# Patient Record
Sex: FEMALE | Race: WHITE | NOT HISPANIC OR LATINO | Employment: FULL TIME | URBAN - METROPOLITAN AREA
[De-identification: names, ages, dates, MRNs, and addresses within clinical notes are randomized per-mention and may not be internally consistent; named-entity substitution may affect disease eponyms.]

---

## 2018-12-13 ENCOUNTER — OFFICE VISIT (OUTPATIENT)
Dept: FAMILY MEDICINE CLINIC | Facility: CLINIC | Age: 23
End: 2018-12-13

## 2018-12-13 VITALS
WEIGHT: 139 LBS | HEIGHT: 62 IN | BODY MASS INDEX: 25.58 KG/M2 | OXYGEN SATURATION: 98 % | SYSTOLIC BLOOD PRESSURE: 116 MMHG | HEART RATE: 82 BPM | DIASTOLIC BLOOD PRESSURE: 62 MMHG

## 2018-12-13 DIAGNOSIS — Z23 NEED FOR IMMUNIZATION AGAINST INFLUENZA: ICD-10-CM

## 2018-12-13 DIAGNOSIS — Z30.018 ENCOUNTER FOR INITIAL PRESCRIPTION OF OTHER CONTRACEPTIVES: ICD-10-CM

## 2018-12-13 DIAGNOSIS — L30.9 DERMATITIS: ICD-10-CM

## 2018-12-13 DIAGNOSIS — B00.9 HERPES SIMPLEX: ICD-10-CM

## 2018-12-13 DIAGNOSIS — M25.512 CHRONIC LEFT SHOULDER PAIN: ICD-10-CM

## 2018-12-13 DIAGNOSIS — G89.29 CHRONIC LEFT SHOULDER PAIN: ICD-10-CM

## 2018-12-13 DIAGNOSIS — L70.0 ACNE VULGARIS: Primary | ICD-10-CM

## 2018-12-13 PROCEDURE — 99204 OFFICE O/P NEW MOD 45 MIN: CPT | Performed by: FAMILY MEDICINE

## 2018-12-13 PROCEDURE — 90471 IMMUNIZATION ADMIN: CPT | Performed by: FAMILY MEDICINE

## 2018-12-13 PROCEDURE — 90686 IIV4 VACC NO PRSV 0.5 ML IM: CPT | Performed by: FAMILY MEDICINE

## 2018-12-13 RX ORDER — CLINDAMYCIN AND BENZOYL PEROXIDE 10; 50 MG/G; MG/G
GEL TOPICAL 2 TIMES DAILY
Qty: 50 G | Refills: 5 | Status: SHIPPED | OUTPATIENT
Start: 2018-12-13 | End: 2020-11-10

## 2018-12-13 RX ORDER — CYCLOBENZAPRINE HCL 5 MG
5-10 TABLET ORAL NIGHTLY PRN
Qty: 30 TABLET | Refills: 5 | Status: SHIPPED | OUTPATIENT
Start: 2018-12-13 | End: 2019-01-31

## 2018-12-13 RX ORDER — TRIAMCINOLONE ACETONIDE 1 MG/G
CREAM TOPICAL 2 TIMES DAILY
Qty: 30 G | Refills: 0 | Status: SHIPPED | OUTPATIENT
Start: 2018-12-13 | End: 2019-01-31

## 2018-12-13 RX ORDER — FAMCICLOVIR 500 MG/1
1000 TABLET ORAL 2 TIMES DAILY
Qty: 16 TABLET | Refills: 0 | Status: SHIPPED | OUTPATIENT
Start: 2018-12-13 | End: 2018-12-14

## 2018-12-13 NOTE — PROGRESS NOTES
Mayda Hernandez presents today to establish care.    Chief Complaint   Patient presents with   • Establish Care     needs PCP        HPI     Acne:  Took birth control since teens and recently stopped in March. Past 3 months acne is bad. Worst its been in her life. Face and neck. Itching on neck and red, dryness. Two years ago used prescription face wash benzoyl peroxide, but it was . Tried clinique acne face wash and lotion, cerave lotion. Tried differin gel but dried out really bad. No aggravating factors.     Birth control:  Stopped pills earlier this year. Considering IUD. Not interested in pregnancy. Likes not having to take pill every day and conveinence. Currently sexually active with men, uses condoms most of the time. LMP 18. Usually periods every 45-50 days. Never pregnant. H/o abnormal pap smears, last done 2-3 years ago. Sometimes bleeds during sex, happened 5 times.     Shoulder pain:  Onset years ago. Worse sitting at a desk all day. Past 5-6 months daily pain. Posterior left shoulder blade. Left hand dominant. Achy pain, can't relieve. If its bad she takes ibuprofen. Feels like nerve, not muscle or bone. No tried chiropractor or massage.     Herpes:  She has a history of genital herpes type I which she was previously given a prescription for Famvir to take as needed for outbreaks.  She reports infrequent outbreaks.      Review of Systems   Constitutional: Negative.    HENT: Negative.    Eyes: Negative.    Respiratory: Negative.    Cardiovascular: Negative.    Gastrointestinal: Negative.    Genitourinary: Positive for menstrual problem.   Musculoskeletal: Positive for arthralgias.   Skin: Positive for rash.   Neurological: Negative.    Hematological: Negative.    Psychiatric/Behavioral: The patient is nervous/anxious.         Past Medical History:   Diagnosis Date   • Abnormal Pap smear of cervix    • Acne    • Herpes     HSV-1 genital        Past Surgical History:   Procedure Laterality  "Date   • WISDOM TOOTH EXTRACTION          Family History   Problem Relation Age of Onset   • Kidney cancer Maternal Grandfather    • Stroke Maternal Grandfather    • Stroke Paternal Grandfather         Social History     Socioeconomic History   • Marital status: Single     Spouse name: Not on file   • Number of children: Not on file   • Years of education: Not on file   • Highest education level: Not on file   Social Needs   • Financial resource strain: Not on file   • Food insecurity - worry: Not on file   • Food insecurity - inability: Not on file   • Transportation needs - medical: Not on file   • Transportation needs - non-medical: Not on file   Occupational History   • Not on file   Tobacco Use   • Smoking status: Never Smoker   • Smokeless tobacco: Never Used   Substance and Sexual Activity   • Alcohol use: No     Frequency: Never   • Drug use: No   • Sexual activity: Not on file   Other Topics Concern   • Not on file   Social History Narrative   • Not on file        No current outpatient medications on file prior to visit.     No current facility-administered medications on file prior to visit.        No Known Allergies     Visit Vitals  /62 (BP Location: Left arm, Patient Position: Sitting, Cuff Size: Adult)   Pulse 82   Ht 157.5 cm (62\")   Wt 63 kg (139 lb)   SpO2 98%   BMI 25.42 kg/m²        Physical Exam   Constitutional: She is oriented to person, place, and time. No distress.   HENT:   Nose: Nose normal.   Mouth/Throat: Oropharynx is clear and moist.   Eyes: Conjunctivae are normal. Pupils are equal, round, and reactive to light.   Neck: Neck supple. No thyromegaly present.   Cardiovascular: Normal rate and regular rhythm.   No murmur heard.  Pulses:       Posterior tibial pulses are 2+ on the right side, and 2+ on the left side.   Pulmonary/Chest: Effort normal and breath sounds normal.   Abdominal: Soft. There is no hepatosplenomegaly. There is no tenderness.   Musculoskeletal: She exhibits no " edema.        Right shoulder: She exhibits no bony tenderness, no deformity and no spasm.        Left shoulder: She exhibits no bony tenderness, no deformity and no spasm.        Arms:  Bilateral rounded shoulders   Lymphadenopathy:     She has no cervical adenopathy.   Neurological: She is alert and oriented to person, place, and time.   Skin: Skin is warm and dry. No rash noted.   Moderate inflammatory papules bilateral cheeks and chin.  Anterior neck midline cherry-red erythematous macules.   Psychiatric: She has a normal mood and affect. Her behavior is normal. Judgment and thought content normal.   Vitals reviewed.     Immunization History   Administered Date(s) Administered   • FLUARIX/FLUZONE/AFLURIA/FLULAVAL QUAD 12/13/2018         No results found for this or any previous visit.     Mayda was seen today for establish care.  Prior gynecology records requested.  Diagnoses and all orders for this visit:    Acne vulgaris  -     clindamycin-benzoyl peroxide (BENZACLIN) 1-5 % gel; Apply  topically to the appropriate area as directed 2 (Two) Times a Day.  New.  Previous medications tried include benzoyl peroxide, oral contraceptive and Differin gel.  At this time start dual treatment with benzaclin starting once at night increasing to 2 times a day as tolerated.  Advised may take 6-8 weeks to notice full clearing of skin.  Dermatitis  -     triamcinolone (KENALOG) 0.1 % cream; Apply  topically to the appropriate area as directed 2 (Two) Times a Day.  New.  Suspect allergic or contact.  Start steroid 2 times a day for up to 2 weeks until clear.  Chronic left shoulder pain  -     cyclobenzaprine (FLEXERIL) 5 MG tablet; Take 1-2 tablets by mouth At Night As Needed for Muscle Spasms.  New.  Suspect poor posture.  Discussed exercises and stretching.  Start Flexeril as needed at night to help spasms.  May continue to use over-the-counter anti-inflammatories as needed.  Encounter for initial prescription of other  contraceptives  Patient interested in long-acting reversible contraception.  Discussed IUD and specific handout provided on [facility]ju[facility] jose.  Discussed with patient she would need to return for physical with Pap as well as obtain her prior Pap in gynecology records for me to review.  She will need to have STI screening beforehand.   Herpes simplex  -     famciclovir (FAMVIR) 500 MG tablet; Take 2 tablets by mouth 2 (Two) Times a Day for 1 day.  Chronic.  Patient given refill prescription for antiviral to take as needed for episodes.  Need for immunization against influenza  -     Fluarix/Fluzone/Afluria Quad>6 Months; Standing  -     Fluarix/Fluzone/Afluria Quad>6 Months        Return for  Annual physical pap .

## 2019-01-23 ENCOUNTER — TELEPHONE (OUTPATIENT)
Dept: FAMILY MEDICINE CLINIC | Facility: CLINIC | Age: 24
End: 2019-01-23

## 2019-01-23 DIAGNOSIS — L70.0 ACNE VULGARIS: Primary | ICD-10-CM

## 2019-01-23 DIAGNOSIS — B00.9 HERPES SIMPLEX: ICD-10-CM

## 2019-01-23 NOTE — TELEPHONE ENCOUNTER
Pt called requesting a referral to see a Dermatologist and a Gynecologist     Pt requesting a call back

## 2019-01-31 ENCOUNTER — OFFICE VISIT (OUTPATIENT)
Dept: OBSTETRICS AND GYNECOLOGY | Facility: CLINIC | Age: 24
End: 2019-01-31

## 2019-01-31 VITALS
WEIGHT: 135.4 LBS | SYSTOLIC BLOOD PRESSURE: 102 MMHG | HEIGHT: 62 IN | BODY MASS INDEX: 24.92 KG/M2 | DIASTOLIC BLOOD PRESSURE: 70 MMHG

## 2019-01-31 DIAGNOSIS — L70.0 ACNE VULGARIS: ICD-10-CM

## 2019-01-31 DIAGNOSIS — N92.6 IRREGULAR MENSES: ICD-10-CM

## 2019-01-31 DIAGNOSIS — Z01.419 ENCOUNTER FOR GYNECOLOGICAL EXAMINATION WITHOUT ABNORMAL FINDING: Primary | ICD-10-CM

## 2019-01-31 PROBLEM — B97.7 HIGH RISK HPV INFECTION: Status: ACTIVE | Noted: 2017-04-06

## 2019-01-31 PROCEDURE — 99385 PREV VISIT NEW AGE 18-39: CPT | Performed by: OBSTETRICS & GYNECOLOGY

## 2019-01-31 NOTE — PROGRESS NOTES
Chief Complaint   Patient presents with   • Menstrual Problem     irregular menses, acne   • Contraception     discuss options   • Gynecologic Exam     desires STD screening       Mayda Hernandez is a 24 y.o. year old  presenting to be seen for her first annual exam with me.  This patient states that she had cyclic menses at the time of menarche, at 13 years of age.  She took oral contraceptives for 7 years and had cyclic menses.  She developed acne while on oral contraceptives.  Since discontinuing oral contraceptives last year she has had irregular menses.  Her last menstrual period was 2018.  She denies symptoms of pregnancy.  She denies headaches or visual symptoms.  She has no history of thyroid disease.  She continues to have acne vulgaris of the face and neck.  She denies bowel or urinary symptoms.  She states that she has had HPV positivity in the past however her last Pap test in 2018 was negative for HPV.  Her partner uses condoms for contraception.  SCREENING TESTS    Year 2012   Age                         PAP       Neg.                  HPV high risk       Neg.                  Mammogram                         NATASHA score                         Breast MRI                         Lipids                         Vitamin D                         Colonoscopy                         DEXA  Frax (hip/any)                         Ovarian Screen                             She exercises regularly: yes.  She wears her seat belt: yes.  She has concerns about domestic violence: no.  She has noticed changes in height: no    GYN screening history:  · Last pap: was done on approximately 2018 and the result was: normal PAP..    No Additional Complaints Reported    The following portions of the patient's history were reviewed and updated as appropriate:vital signs and   She  has a past  "medical history of Abnormal Pap smear of cervix, Acne, Herpes, High risk HPV infection (04/06/2017), and LGSIL on Pap smear of cervix (01/13/2016).  She does not have any pertinent problems on file.  She  has a past surgical history that includes [place] tooth extraction.  Her family history includes Kidney cancer in her maternal grandfather; Stroke in her maternal grandfather and paternal grandfather.  She  reports that  has never smoked. she has never used smokeless tobacco. She reports that she does not drink alcohol or use drugs.  Current Outpatient Medications   Medication Sig Dispense Refill   • clindamycin-benzoyl peroxide (BENZACLIN) 1-5 % gel Apply  topically to the appropriate area as directed 2 (Two) Times a Day. 50 g 5     No current facility-administered medications for this visit.      She has No Known Allergies..    Review of Systems  A comprehensive review of systems was taken.  Constitutional: negative for fever, chills, activity change, appetite change, fatigue and unexpected weight change.  Respiratory: negative  Cardiovascular: negative  Gastrointestinal: negative  Genitourinary:positive for irregular menses  Musculoskeletal:negative  Behavioral/Psych: negative       /70   Ht 157.5 cm (62\")   Wt 61.4 kg (135 lb 6.4 oz)   LMP 11/01/2018 (Exact Date)   BMI 24.76 kg/m²     Physical Exam    General:  alert; cooperative; well developed; well nourished   Skin:  Facial acne   Thyroid: normal to inspection and palpation   Lungs:  clear to auscultation bilaterally   Heart:  regular rate and rhythm, S1, S2 normal, no murmur, click, rub or gallop   Breasts:  Examined in supine position  Symmetric without masses or skin dimpling  Nipples normal without inversion, lesions or discharge  There are no palpable axillary nodes   Abdomen: soft, non-tender; no masses  no umbilical or inguinal hernias are present  no hepato-splenomegaly   Pelvis: Clinical staff was present for exam  External genitalia:  " normal appearance of the external genitalia including Bartholin's and [place]'s glands.  Vaginal:  normal pink mucosa without prolapse or lesions.  Cervix:  normal appearance.  Uterus:  normal size, shape and consistency. anteverted;  Adnexa:  normal bimanual exam of the adnexa.  Rectal:  anus visually normal appearing. recto-vaginal exam unremarkable and confirms findings;     Lab Review   No data reviewed    Imaging  No data reviewed         ASSESSMENT  Problems Addressed this Visit        Musculoskeletal and Integument    Acne    Relevant Orders    Testosterone      Other Visit Diagnoses     Encounter for gynecological examination without abnormal finding    -  Primary    Relevant Orders    Liquid-based Pap Smear, Screening    Irregular menses        Relevant Orders    TSH    Prolactin    Glucose, Fasting    Insulin, Random    HCG, B-subunit, Quantitative          PLAN    · Medications prescribed this encounter:  No orders of the defined types were placed in this encounter.  · I have instructed the patient in monthly self breast assessment  · I have had a 12 minute face-to-face discussion with the patient about her clinical findings of irregular menses and facial acne.  I have counseled her that an oral contraceptive would be the best treatment for both conditions.  I have counseled her that she needs to have a fasting glucose, fasting insulin, TSH, HCG, total testosterone, and a prolactin ordered.  She does not desire to start oral contraceptives until after her laboratory evaluation.  I have counseled her to see dermatology.  · Regular weight-bearing exercise  · Follow up: 6 month(s)  *Please note that portions of this documentation may have been completed with a voice recognition program.  Efforts were made to edit this dictation, but occasional words may have been mistranscribed.       This note was electronically signed.    DRAGAN Purcell MD  January 31, 2019  9:30 AM

## 2019-02-01 ENCOUNTER — LAB (OUTPATIENT)
Dept: LAB | Facility: HOSPITAL | Age: 24
End: 2019-02-01

## 2019-02-01 DIAGNOSIS — N92.6 IRREGULAR MENSES: ICD-10-CM

## 2019-02-01 DIAGNOSIS — L70.0 ACNE VULGARIS: ICD-10-CM

## 2019-02-01 LAB
GLUCOSE P FAST SERPL-MCNC: 91 MG/DL (ref 65–99)
HCG INTACT+B SERPL-ACNC: <5 MIU/ML
PROLACTIN SERPL-MCNC: 11.5 NG/ML
TESTOST SERPL-MCNC: 44.55 NG/DL (ref 0–813.86)
TSH SERPL DL<=0.05 MIU/L-ACNC: 1.94 MIU/ML (ref 0.35–5.35)

## 2019-02-01 PROCEDURE — 83525 ASSAY OF INSULIN: CPT

## 2019-02-01 PROCEDURE — 84403 ASSAY OF TOTAL TESTOSTERONE: CPT

## 2019-02-01 PROCEDURE — 82947 ASSAY GLUCOSE BLOOD QUANT: CPT

## 2019-02-01 PROCEDURE — 84443 ASSAY THYROID STIM HORMONE: CPT

## 2019-02-01 PROCEDURE — 36415 COLL VENOUS BLD VENIPUNCTURE: CPT

## 2019-02-01 PROCEDURE — 84702 CHORIONIC GONADOTROPIN TEST: CPT

## 2019-02-01 PROCEDURE — 84146 ASSAY OF PROLACTIN: CPT

## 2019-02-06 LAB — INSULIN SERPL-ACNC: 7.1 UIU/ML

## 2019-02-11 ENCOUNTER — TELEPHONE (OUTPATIENT)
Dept: OBSTETRICS AND GYNECOLOGY | Facility: CLINIC | Age: 24
End: 2019-02-11

## 2019-02-11 NOTE — TELEPHONE ENCOUNTER
Patient had numerous lab tests and a pap test done.  All labs were normal.  Her pap test showed LGSIL, and she has a history of HPV.  She was scheduled for colposcopy 2/14/19.

## 2019-02-28 ENCOUNTER — OFFICE VISIT (OUTPATIENT)
Dept: OBSTETRICS AND GYNECOLOGY | Facility: CLINIC | Age: 24
End: 2019-02-28

## 2019-02-28 VITALS
WEIGHT: 135.8 LBS | SYSTOLIC BLOOD PRESSURE: 100 MMHG | BODY MASS INDEX: 24.99 KG/M2 | HEIGHT: 62 IN | DIASTOLIC BLOOD PRESSURE: 60 MMHG

## 2019-02-28 DIAGNOSIS — N91.1 SECONDARY AMENORRHEA: ICD-10-CM

## 2019-02-28 DIAGNOSIS — R87.612 LGSIL ON PAP SMEAR OF CERVIX: Primary | ICD-10-CM

## 2019-02-28 PROCEDURE — 57455 BIOPSY OF CERVIX W/SCOPE: CPT | Performed by: OBSTETRICS & GYNECOLOGY

## 2019-02-28 RX ORDER — MEDROXYPROGESTERONE ACETATE 5 MG/1
5 TABLET ORAL 2 TIMES DAILY
Qty: 10 TABLET | Refills: 0 | Status: SHIPPED | OUTPATIENT
Start: 2019-02-28 | End: 2020-11-10

## 2019-02-28 NOTE — PROGRESS NOTES
"Date of precedure: 2/28/2019.  Should has a prior history of mild dysplasia of the cervix diagnosed elsewhere.  She states that she was HPV positive.  She had a Pap test last year that was negative.  Her Pap test from 1/31/2019 was read as low-grade SABAS, and she is here today for follow-up colposcopy with possible biopsies.  I have explained the procedure to the patient including the risks of pain, bleeding, and infection.  She voiced understanding of these risks.  She has also developed irregular menses since discontinuing birth control pills last year.  Her last menstrual period began on 11/1/2018.  Recent lab work revealed a negative hCG, a normal serum prolactin, a normal total testosterone, and a glucose insulin ratio of 91/7.1.  She desires medication to start her.,  But does not desire to restart oral contraceptives.    /60   Ht 157.5 cm (62\")   Wt 61.6 kg (135 lb 12.8 oz)   LMP 11/01/2018 (Exact Date)   BMI 24.84 kg/m²     No OB History data found    Risks and benefits discussed? yes  All questions answered? yes  Consents given by the patient  Written consent obtained? yes    Local anesthesia used:  no    Pre-op indication: LGSIL - mild dysplasia  Procedure documentation:    The cervix was initially viewed colposcopically through a green filter.  The cervix was next bathed in dilute, acetic acid.   The findings were as follows:      The transformation zone was able to be seen adequately.    Findings: There were aceto-white changes at 11:00 and 6:00 and mosaicism at 5:00.   Physical Exam: Lungs- Clear to auscultation; C-V- RRR with no murmur, rub or gallop; Abd.- Soft, non-tender with no organomegaly           Ectocervical biopsies taken from 5 o'clock and 6 o'clock.  Monsels solution was applied to the biopsy sites..  An ECC was not performed.      Colposcopic Impression: 1. Adequate colposcopy  2. Colposcopic findings are consistent with PAP read as LGSIL       Plan: Will base further treatment on " pathology results   Post biopsy instructions given to patient.   Specimens labelled and sent to pathology.   Provera, 5 mg BID for 5 days to induce menses    *Please note that portions of this documentation may have been completed with a voice recognition program.  Efforts were made to edit this dictation, but occasional words may have been mistranscribed.     This note was electronically signed.    DRAGAN Purcell MD  February 28, 2019  3:50 PM

## 2020-01-17 ENCOUNTER — TELEPHONE (OUTPATIENT)
Dept: OBSTETRICS AND GYNECOLOGY | Facility: CLINIC | Age: 25
End: 2020-01-17

## 2020-01-17 ENCOUNTER — TELEPHONE (OUTPATIENT)
Dept: FAMILY MEDICINE CLINIC | Facility: CLINIC | Age: 25
End: 2020-01-17

## 2020-01-17 NOTE — TELEPHONE ENCOUNTER
Patient calls today at 4:20 pm requesting a prescription for a herpes outbreak.  Dr. Purcell is out of the office and Dr. Purcell has not prescribed medication for HSV for this patient before.  There was record of her PCP prescribing Famvir 12/2018, so I suggested that she contact their office to see if she could get a prescription from them.  She was advised to call this number after 5 pm if not able to get from PCP and the exchange will get in touch with Dr. Purcell regarding this patient.

## 2020-01-17 NOTE — TELEPHONE ENCOUNTER
Called patient, told her that she would need an appointment for refills. Patient has not been seen since December 2018. Patient states that she will call back on Monday.

## 2020-01-17 NOTE — TELEPHONE ENCOUNTER
Pt called and requested a refill of famciclovir (FAMVIR) 500 MG tablet. Pt stated it has been a while since she had this medication but she is in need of it now. Pt requests med be sent to the [facility] [address] [place] callback- [phone]

## 2020-11-10 ENCOUNTER — LAB (OUTPATIENT)
Dept: LAB | Facility: HOSPITAL | Age: 25
End: 2020-11-10

## 2020-11-10 ENCOUNTER — OFFICE VISIT (OUTPATIENT)
Dept: INTERNAL MEDICINE | Facility: CLINIC | Age: 25
End: 2020-11-10

## 2020-11-10 VITALS
SYSTOLIC BLOOD PRESSURE: 108 MMHG | HEIGHT: 62 IN | WEIGHT: 152 LBS | BODY MASS INDEX: 27.97 KG/M2 | HEART RATE: 88 BPM | DIASTOLIC BLOOD PRESSURE: 84 MMHG | TEMPERATURE: 97.4 F | OXYGEN SATURATION: 98 %

## 2020-11-10 DIAGNOSIS — Z00.00 ANNUAL PHYSICAL EXAM: Primary | ICD-10-CM

## 2020-11-10 DIAGNOSIS — T14.8XXA MUSCLE STRAIN: ICD-10-CM

## 2020-11-10 LAB
25(OH)D3 SERPL-MCNC: 18.9 NG/ML (ref 30–100)
ALBUMIN SERPL-MCNC: 4.7 G/DL (ref 3.5–5.2)
ALBUMIN/GLOB SERPL: 2.1 G/DL
ALP SERPL-CCNC: 69 U/L (ref 39–117)
ALT SERPL W P-5'-P-CCNC: 24 U/L (ref 1–33)
ANION GAP SERPL CALCULATED.3IONS-SCNC: 9.8 MMOL/L (ref 5–15)
AST SERPL-CCNC: 25 U/L (ref 1–32)
BILIRUB SERPL-MCNC: 0.2 MG/DL (ref 0–1.2)
BUN SERPL-MCNC: 11 MG/DL (ref 6–20)
BUN/CREAT SERPL: 15.3 (ref 7–25)
CALCIUM SPEC-SCNC: 9 MG/DL (ref 8.6–10.5)
CHLORIDE SERPL-SCNC: 106 MMOL/L (ref 98–107)
CHOLEST SERPL-MCNC: 180 MG/DL (ref 0–200)
CO2 SERPL-SCNC: 23.2 MMOL/L (ref 22–29)
CREAT SERPL-MCNC: 0.72 MG/DL (ref 0.57–1)
DEPRECATED RDW RBC AUTO: 40.4 FL (ref 37–54)
ERYTHROCYTE [DISTWIDTH] IN BLOOD BY AUTOMATED COUNT: 12.6 % (ref 12.3–15.4)
GFR SERPL CREATININE-BSD FRML MDRD: 99 ML/MIN/1.73
GLOBULIN UR ELPH-MCNC: 2.2 GM/DL
GLUCOSE SERPL-MCNC: 92 MG/DL (ref 65–99)
HCT VFR BLD AUTO: 39.7 % (ref 34–46.6)
HDLC SERPL-MCNC: 57 MG/DL (ref 40–60)
HGB BLD-MCNC: 13.8 G/DL (ref 12–15.9)
LDLC SERPL CALC-MCNC: 104 MG/DL (ref 0–100)
LDLC/HDLC SERPL: 1.78 {RATIO}
MCH RBC QN AUTO: 30.9 PG (ref 26.6–33)
MCHC RBC AUTO-ENTMCNC: 34.8 G/DL (ref 31.5–35.7)
MCV RBC AUTO: 88.8 FL (ref 79–97)
PLATELET # BLD AUTO: 211 10*3/MM3 (ref 140–450)
PMV BLD AUTO: 10 FL (ref 6–12)
POTASSIUM SERPL-SCNC: 4.2 MMOL/L (ref 3.5–5.2)
PROT SERPL-MCNC: 6.9 G/DL (ref 6–8.5)
RBC # BLD AUTO: 4.47 10*6/MM3 (ref 3.77–5.28)
SODIUM SERPL-SCNC: 139 MMOL/L (ref 136–145)
TRIGL SERPL-MCNC: 107 MG/DL (ref 0–150)
TSH SERPL DL<=0.05 MIU/L-ACNC: 2.98 UIU/ML (ref 0.27–4.2)
VIT B12 BLD-MCNC: 411 PG/ML (ref 211–946)
VLDLC SERPL-MCNC: 19 MG/DL (ref 5–40)
WBC # BLD AUTO: 5.92 10*3/MM3 (ref 3.4–10.8)

## 2020-11-10 PROCEDURE — 82607 VITAMIN B-12: CPT | Performed by: INTERNAL MEDICINE

## 2020-11-10 PROCEDURE — 99213 OFFICE O/P EST LOW 20 MIN: CPT | Performed by: INTERNAL MEDICINE

## 2020-11-10 PROCEDURE — 80053 COMPREHEN METABOLIC PANEL: CPT | Performed by: INTERNAL MEDICINE

## 2020-11-10 PROCEDURE — 85027 COMPLETE CBC AUTOMATED: CPT | Performed by: INTERNAL MEDICINE

## 2020-11-10 PROCEDURE — 99385 PREV VISIT NEW AGE 18-39: CPT | Performed by: INTERNAL MEDICINE

## 2020-11-10 PROCEDURE — 84443 ASSAY THYROID STIM HORMONE: CPT | Performed by: INTERNAL MEDICINE

## 2020-11-10 PROCEDURE — 80061 LIPID PANEL: CPT | Performed by: INTERNAL MEDICINE

## 2020-11-10 PROCEDURE — 90471 IMMUNIZATION ADMIN: CPT | Performed by: INTERNAL MEDICINE

## 2020-11-10 PROCEDURE — 90715 TDAP VACCINE 7 YRS/> IM: CPT | Performed by: INTERNAL MEDICINE

## 2020-11-10 PROCEDURE — 82306 VITAMIN D 25 HYDROXY: CPT | Performed by: INTERNAL MEDICINE

## 2020-11-10 NOTE — PROGRESS NOTES
Office Note      Date: 11/10/2020  Patient Name: Mayda Hernandez  MRN: [id number]  : 1995    Chief Complaint   Patient presents with   • Establish Care     right sided rib pain        History of Present Illness: Mayda Hernandez is a 25 y.o. female who presents for Establish Care (right sided rib pain ). Started Saturday while cleaning the house. Pain last 4-5 days. Non radiating. Worsened with movement and deep inspiration.     Has tried ibuprofen and ice pack w/ relief.   No constipation.     Requesting annual physical. Has not seen a PCP in a while.     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   Subjective      Review of Systems:   Pertinent review of systems per HPI.    Review of Systems   Constitutional: Negative for activity change, appetite change, chills, diaphoresis, fatigue, fever and unexpected weight change.   HENT: Negative for congestion, dental problem, drooling, ear discharge, ear pain, facial swelling, hearing loss and mouth sores.    Eyes: Negative for pain, discharge and itching.   Respiratory: Negative for apnea, cough, choking, chest tightness and shortness of breath.    Cardiovascular: Negative for chest pain, palpitations and leg swelling.   Gastrointestinal: Negative for abdominal distention, abdominal pain, blood in stool, constipation and diarrhea.   Endocrine: Negative for cold intolerance, heat intolerance, polydipsia and polyuria.   Genitourinary: Negative for difficulty urinating, dysuria, frequency and hematuria.   Musculoskeletal: Positive for arthralgias.   Skin: Negative for color change, pallor, rash and wound.   Allergic/Immunologic: Negative for environmental allergies, food allergies and immunocompromised state.   Neurological: Negative for dizziness, weakness and light-headedness.   Psychiatric/Behavioral: Negative for agitation, behavioral problems, confusion, decreased concentration and self-injury. The patient is not nervous/anxious.    All other systems reviewed and are  "negative.    No Known Allergies    Objective     Physical Exam:  Vital Signs:   Vitals:    11/10/20 0827   BP: 108/84   BP Location: Left arm   Patient Position: Sitting   Cuff Size: Large Adult   Pulse: 88   Temp: 97.4 °F (36.3 °C)   TempSrc: Temporal   SpO2: 98%   Weight: 68.9 kg (152 lb)   Height: 157.5 cm (62\")      Body mass index is 27.8 kg/m².    Physical Exam  Vitals signs and nursing note reviewed.   Constitutional:       General: She is not in acute distress.     Appearance: She is well-developed.   HENT:      Head: Normocephalic and atraumatic.      Right Ear: External ear normal.      Left Ear: External ear normal.   Cardiovascular:      Rate and Rhythm: Normal rate and regular rhythm.      Heart sounds: Normal heart sounds. No murmur. No friction rub. No gallop.    Pulmonary:      Effort: Pulmonary effort is normal. No respiratory distress.      Breath sounds: Normal breath sounds. No wheezing or rales.   Musculoskeletal:      Comments: Non TTP of posterior rib cage, no swelling   Skin:     General: Skin is warm and dry.      Coloration: Skin is not pale.         Assessment / Plan      Assessment & Plan:    1. Annual physical exam  Counseled on:  Mammo starting at age 40  Pap smear q5 years if normal pap cytology with neg HPV, otherwise q3 years  Colonoscopy at 45-49 y/o  PNA vaccinations starting at age 65  shingrix at age 50  Td/Tdap q 10 years  Wear seatbelt when driving  Flu shot annually.  - CBC (No Diff)  - Comprehensive Metabolic Panel  - Lipid Panel  - TSH Rfx On Abnormal To Free T4  - Vitamin B12  - Vitamin D 25 Hydroxy  - Hepatitis C Antibody    2. Muscle strain  Pain likely from muscle strain. Continue NSAIDs.       Magali Yousif MD  11/10/2020     Please note that portions of this note may have been completed with a voice recognition program. Efforts were made to edit the dictations, but occasionally words are mistranscribed.  "

## 2020-11-10 NOTE — PATIENT INSTRUCTIONS
Costochondritis  Costochondritis is swelling and irritation (inflammation) of the tissue (cartilage) that connects your ribs to your breastbone (sternum). This causes pain in the front of your chest. Usually, the pain:  · Starts gradually.  · Is in more than one rib.  This condition usually goes away on its own over time.  Follow these instructions at home:  · Do not do anything that makes your pain worse.  · If directed, put ice on the painful area:  ? Put ice in a plastic bag.  ? Place a towel between your skin and the bag.  ? Leave the ice on for 20 minutes, 2-3 times a day.  · If directed, put heat on the affected area as often as told by your doctor. Use the heat source that your doctor tells you to use, such as a moist heat pack or a heating pad.  ? Place a towel between your skin and the heat source.  ? Leave the heat on for 20-30 minutes.  ? Take off the heat if your skin turns bright red. This is very important if you cannot feel pain, heat, or cold. You may have a greater risk of getting burned.  · Take over-the-counter and prescription medicines only as told by your doctor.  · Return to your normal activities as told by your doctor. Ask your doctor what activities are safe for you.  · Keep all follow-up visits as told by your doctor. This is important.  Contact a doctor if:  · You have chills or a fever.  · Your pain does not go away or it gets worse.  · You have a cough that does not go away.  Get help right away if:  · You are short of breath.  This information is not intended to replace advice given to you by your health care provider. Make sure you discuss any questions you have with your health care provider.  Document Released: 06/05/2009 Document Revised: 01/02/2019 Document Reviewed: 04/12/2017  Elsevier Patient Education © 2020 [employer]

## 2020-11-11 RX ORDER — ERGOCALCIFEROL 1.25 MG/1
50000 CAPSULE ORAL WEEKLY
Qty: 12 CAPSULE | Refills: 0 | Status: SHIPPED | OUTPATIENT
Start: 2020-11-11 | End: 2021-02-05

## 2020-12-31 ENCOUNTER — E-VISIT (OUTPATIENT)
Dept: INTERNAL MEDICINE | Facility: CLINIC | Age: 25
End: 2020-12-31

## 2020-12-31 DIAGNOSIS — J01.90 ACUTE NON-RECURRENT SINUSITIS, UNSPECIFIED LOCATION: Primary | ICD-10-CM

## 2020-12-31 PROCEDURE — 99213 OFFICE O/P EST LOW 20 MIN: CPT | Performed by: INTERNAL MEDICINE

## 2020-12-31 RX ORDER — AMOXICILLIN AND CLAVULANATE POTASSIUM 875; 125 MG/1; MG/1
1 TABLET, FILM COATED ORAL 2 TIMES DAILY
Qty: 20 TABLET | Refills: 0 | Status: SHIPPED | OUTPATIENT
Start: 2020-12-31 | End: 2021-03-31

## 2021-02-05 RX ORDER — ERGOCALCIFEROL 1.25 MG/1
50000 CAPSULE ORAL WEEKLY
Qty: 4 CAPSULE | Refills: 2 | Status: SHIPPED | OUTPATIENT
Start: 2021-02-05 | End: 2021-03-31

## 2021-03-31 ENCOUNTER — TELEMEDICINE (OUTPATIENT)
Dept: INTERNAL MEDICINE | Facility: CLINIC | Age: 26
End: 2021-03-31

## 2021-03-31 DIAGNOSIS — J30.2 SEASONAL ALLERGIES: Primary | ICD-10-CM

## 2021-03-31 PROCEDURE — 99213 OFFICE O/P EST LOW 20 MIN: CPT | Performed by: INTERNAL MEDICINE

## 2021-03-31 RX ORDER — FLUTICASONE PROPIONATE 50 MCG
2 SPRAY, SUSPENSION (ML) NASAL DAILY
Qty: 11.1 ML | Refills: 3 | Status: SHIPPED | OUTPATIENT
Start: 2021-03-31 | End: 2022-02-16

## 2021-03-31 RX ORDER — LORATADINE 10 MG/1
10 TABLET ORAL DAILY
Qty: 30 TABLET | Refills: 3 | Status: SHIPPED | OUTPATIENT
Start: 2021-03-31 | End: 2021-06-15

## 2021-03-31 NOTE — PROGRESS NOTES
Video visit Note      Date: 2021  Patient Name: Mayda Hernandez  MRN: [id number]  : 1995    CC: allergies    History of Present Illness: Mayda Hernandez is a 26 y.o. female who presents for allergies. She has not tried anything for sx. Having sinus congestion. No fever or SOB.     Subjective      Review of Systems:   Pertinent review of systems per HPI.    Review of Systems   Constitutional: Negative for activity change, appetite change, chills, diaphoresis, fatigue, fever and unexpected weight change.   HENT: Positive for congestion and sinus pressure. Negative for dental problem, drooling, ear discharge, ear pain, facial swelling, hearing loss and mouth sores.    Eyes: Negative for pain, discharge and itching.   Respiratory: Negative for apnea, cough, choking, chest tightness and shortness of breath.    Cardiovascular: Negative for chest pain, palpitations and leg swelling.   Gastrointestinal: Negative for abdominal distention, abdominal pain, blood in stool, constipation and diarrhea.   Endocrine: Negative for cold intolerance, heat intolerance, polydipsia and polyuria.   Genitourinary: Negative for difficulty urinating, dysuria, frequency and hematuria.   Skin: Negative for color change, pallor, rash and wound.   Allergic/Immunologic: Negative for environmental allergies, food allergies and immunocompromised state.   Neurological: Negative for dizziness, weakness and light-headedness.   Psychiatric/Behavioral: Negative for agitation, behavioral problems, confusion, decreased concentration and self-injury. The patient is not nervous/anxious.    All other systems reviewed and are negative.    No Known Allergies    Objective     Physical Exam:  Vital Signs: There were no vitals filed for this visit.   There is no height or weight on file to calculate BMI.    Physical Exam    Assessment / Plan      Assessment & Plan:    1. Seasonal allergies  rx for claritin and flonase. If no improvement she can send me  a message and I will send in singulair 10mg qhs which we discussed.   You have chosen to receive care through a telehealth visit.  Do you consent to use a video/audio connection for your medical care today? Yes  I spent 15 minutes on this encounter    Magali Hay MD  03/31/2021     Please note that portions of this note may have been completed with a voice recognition program. Efforts were made to edit the dictations, but occasionally words are mistranscribed.

## 2021-06-04 ENCOUNTER — TELEPHONE (OUTPATIENT)
Dept: URGENT CARE | Facility: CLINIC | Age: 26
End: 2021-06-04

## 2021-06-04 NOTE — TELEPHONE ENCOUNTER
Reviewed x-ray results with patient.  She can follow-up with her primary care or return to clinic for any worsening of symptoms.

## 2021-06-16 RX ORDER — LORATADINE 10 MG/1
TABLET ORAL
Qty: 30 TABLET | Refills: 2 | Status: SHIPPED | OUTPATIENT
Start: 2021-06-16 | End: 2022-02-16

## 2021-11-03 RX ORDER — LORATADINE 10 MG/1
TABLET ORAL
Qty: 90 TABLET | OUTPATIENT
Start: 2021-11-03

## 2021-11-16 ENCOUNTER — TRANSCRIBE ORDERS (OUTPATIENT)
Dept: LAB | Facility: HOSPITAL | Age: 26
End: 2021-11-16

## 2021-11-16 ENCOUNTER — LAB (OUTPATIENT)
Dept: LAB | Facility: HOSPITAL | Age: 26
End: 2021-11-16

## 2021-11-16 DIAGNOSIS — N92.6 IRREGULAR MENSTRUAL CYCLE: Primary | ICD-10-CM

## 2021-11-16 DIAGNOSIS — N92.6 IRREGULAR MENSTRUAL CYCLE: ICD-10-CM

## 2021-11-16 LAB
CHOLEST SERPL-MCNC: 136 MG/DL (ref 0–200)
DEPRECATED RDW RBC AUTO: 40 FL (ref 37–54)
ERYTHROCYTE [DISTWIDTH] IN BLOOD BY AUTOMATED COUNT: 12.4 % (ref 12.3–15.4)
HBA1C MFR BLD: 4.84 % (ref 4.8–5.6)
HCT VFR BLD AUTO: 40.1 % (ref 34–46.6)
HDLC SERPL-MCNC: 48 MG/DL (ref 40–60)
HGB BLD-MCNC: 13.9 G/DL (ref 12–15.9)
LDLC SERPL CALC-MCNC: 74 MG/DL (ref 0–100)
LDLC/HDLC SERPL: 1.54 {RATIO}
MCH RBC QN AUTO: 30.8 PG (ref 26.6–33)
MCHC RBC AUTO-ENTMCNC: 34.7 G/DL (ref 31.5–35.7)
MCV RBC AUTO: 88.7 FL (ref 79–97)
PLATELET # BLD AUTO: 154 10*3/MM3 (ref 140–450)
PMV BLD AUTO: 10.5 FL (ref 6–12)
RBC # BLD AUTO: 4.52 10*6/MM3 (ref 3.77–5.28)
TRIGL SERPL-MCNC: 71 MG/DL (ref 0–150)
VLDLC SERPL-MCNC: 14 MG/DL (ref 5–40)
WBC # BLD AUTO: 6.16 10*3/MM3 (ref 3.4–10.8)

## 2021-11-16 PROCEDURE — 83001 ASSAY OF GONADOTROPIN (FSH): CPT

## 2021-11-16 PROCEDURE — 82627 DEHYDROEPIANDROSTERONE: CPT

## 2021-11-16 PROCEDURE — 83002 ASSAY OF GONADOTROPIN (LH): CPT

## 2021-11-16 PROCEDURE — 85027 COMPLETE CBC AUTOMATED: CPT | Performed by: OBSTETRICS & GYNECOLOGY

## 2021-11-16 PROCEDURE — 84402 ASSAY OF FREE TESTOSTERONE: CPT

## 2021-11-16 PROCEDURE — 84403 ASSAY OF TOTAL TESTOSTERONE: CPT

## 2021-11-16 PROCEDURE — 82670 ASSAY OF TOTAL ESTRADIOL: CPT

## 2021-11-16 PROCEDURE — 84443 ASSAY THYROID STIM HORMONE: CPT | Performed by: OBSTETRICS & GYNECOLOGY

## 2021-11-16 PROCEDURE — 83498 ASY HYDROXYPROGESTERONE 17-D: CPT

## 2021-11-16 PROCEDURE — 36415 COLL VENOUS BLD VENIPUNCTURE: CPT

## 2021-11-16 PROCEDURE — 84146 ASSAY OF PROLACTIN: CPT

## 2021-11-16 PROCEDURE — 84144 ASSAY OF PROGESTERONE: CPT | Performed by: OBSTETRICS & GYNECOLOGY

## 2021-11-16 PROCEDURE — 80061 LIPID PANEL: CPT

## 2021-11-16 PROCEDURE — 83525 ASSAY OF INSULIN: CPT

## 2021-11-16 PROCEDURE — 83036 HEMOGLOBIN GLYCOSYLATED A1C: CPT

## 2021-11-17 LAB
DHEA-S SERPL-MCNC: 162 UG/DL (ref 84.8–378)
ESTRADIOL SERPL HS-MCNC: 285 PG/ML
FSH SERPL-ACNC: 2.89 MIU/ML
LH SERPL-ACNC: 13.8 MIU/ML
PROGEST SERPL-MCNC: 25.1 NG/ML
PROLACTIN SERPL-MCNC: 36 NG/ML (ref 4.79–23.3)
TESTOST SERPL-MCNC: 31 NG/DL (ref 8.4–48.1)
TSH SERPL DL<=0.05 MIU/L-ACNC: 3.64 UIU/ML (ref 0.27–4.2)

## 2021-11-18 LAB — TESTOST FREE SERPL-MCNC: 2.7 PG/ML (ref 0–4.2)

## 2021-11-19 LAB — INSULIN SERPL-ACNC: 4.6 UIU/ML

## 2021-11-21 LAB — 17OHP SERPL-MCNC: 378 NG/DL

## 2021-11-22 ENCOUNTER — LAB (OUTPATIENT)
Dept: LAB | Facility: HOSPITAL | Age: 26
End: 2021-11-22

## 2021-11-22 ENCOUNTER — TRANSCRIBE ORDERS (OUTPATIENT)
Dept: LAB | Facility: HOSPITAL | Age: 26
End: 2021-11-22

## 2021-11-22 DIAGNOSIS — R94.7 NONSPECIFIC ABNORMAL RESULTS OF ENDOCRINE FUNCTION STUDY: Primary | ICD-10-CM

## 2021-11-22 DIAGNOSIS — R94.7 NONSPECIFIC ABNORMAL RESULTS OF ENDOCRINE FUNCTION STUDY: ICD-10-CM

## 2021-11-22 PROCEDURE — 84146 ASSAY OF PROLACTIN: CPT

## 2021-11-22 PROCEDURE — 36415 COLL VENOUS BLD VENIPUNCTURE: CPT

## 2021-11-23 LAB — PROLACTIN SERPL-MCNC: 25.1 NG/ML (ref 4.79–23.3)

## 2022-01-13 ENCOUNTER — OFFICE VISIT (OUTPATIENT)
Dept: INTERNAL MEDICINE | Facility: CLINIC | Age: 27
End: 2022-01-13

## 2022-01-13 ENCOUNTER — LAB (OUTPATIENT)
Dept: LAB | Facility: HOSPITAL | Age: 27
End: 2022-01-13

## 2022-01-13 VITALS
BODY MASS INDEX: 26.06 KG/M2 | HEART RATE: 78 BPM | SYSTOLIC BLOOD PRESSURE: 116 MMHG | WEIGHT: 138 LBS | OXYGEN SATURATION: 98 % | RESPIRATION RATE: 16 BRPM | DIASTOLIC BLOOD PRESSURE: 74 MMHG | HEIGHT: 61 IN | TEMPERATURE: 96.9 F

## 2022-01-13 DIAGNOSIS — Z76.89 ENCOUNTER FOR WEIGHT MANAGEMENT: ICD-10-CM

## 2022-01-13 DIAGNOSIS — R79.89 ELEVATED PROLACTIN LEVEL: ICD-10-CM

## 2022-01-13 DIAGNOSIS — Z00.00 ANNUAL PHYSICAL EXAM: Primary | ICD-10-CM

## 2022-01-13 DIAGNOSIS — Z23 NEED FOR COVID-19 VACCINE: ICD-10-CM

## 2022-01-13 LAB
DEPRECATED RDW RBC AUTO: 39.5 FL (ref 37–54)
ERYTHROCYTE [DISTWIDTH] IN BLOOD BY AUTOMATED COUNT: 12.2 % (ref 12.3–15.4)
HBA1C MFR BLD: 5.31 % (ref 4.8–5.6)
HCT VFR BLD AUTO: 43.6 % (ref 34–46.6)
HGB BLD-MCNC: 14.6 G/DL (ref 12–15.9)
MCH RBC QN AUTO: 30.1 PG (ref 26.6–33)
MCHC RBC AUTO-ENTMCNC: 33.5 G/DL (ref 31.5–35.7)
MCV RBC AUTO: 89.9 FL (ref 79–97)
PLATELET # BLD AUTO: 217 10*3/MM3 (ref 140–450)
PMV BLD AUTO: 10.2 FL (ref 6–12)
RBC # BLD AUTO: 4.85 10*6/MM3 (ref 3.77–5.28)
VIT B12 BLD-MCNC: 667 PG/ML (ref 211–946)
WBC NRBC COR # BLD: 6.95 10*3/MM3 (ref 3.4–10.8)

## 2022-01-13 PROCEDURE — 83036 HEMOGLOBIN GLYCOSYLATED A1C: CPT | Performed by: INTERNAL MEDICINE

## 2022-01-13 PROCEDURE — 84146 ASSAY OF PROLACTIN: CPT | Performed by: INTERNAL MEDICINE

## 2022-01-13 PROCEDURE — 99213 OFFICE O/P EST LOW 20 MIN: CPT | Performed by: INTERNAL MEDICINE

## 2022-01-13 PROCEDURE — 0003A COVID-19 (PFIZER): CPT | Performed by: INTERNAL MEDICINE

## 2022-01-13 PROCEDURE — 80050 GENERAL HEALTH PANEL: CPT | Performed by: INTERNAL MEDICINE

## 2022-01-13 PROCEDURE — 80061 LIPID PANEL: CPT | Performed by: INTERNAL MEDICINE

## 2022-01-13 PROCEDURE — 82607 VITAMIN B-12: CPT | Performed by: INTERNAL MEDICINE

## 2022-01-13 PROCEDURE — 91300 COVID-19 (PFIZER): CPT | Performed by: INTERNAL MEDICINE

## 2022-01-13 PROCEDURE — 99395 PREV VISIT EST AGE 18-39: CPT | Performed by: INTERNAL MEDICINE

## 2022-01-13 RX ORDER — METFORMIN HYDROCHLORIDE 750 MG/1
1500 TABLET, EXTENDED RELEASE ORAL NIGHTLY
COMMUNITY
Start: 2021-12-17

## 2022-01-13 NOTE — PROGRESS NOTES
Office Note      Date: 2022  Patient Name: Mayda Hernandez  MRN: [id number]  : 1995    Chief Complaint   Patient presents with   • Annual Exam     would like booster today.       History of Present Illness: Mayda Hernandez is a 27 y.o. female who presents for Annual Exam (would like booster today.). Getting  and April and has not been able to lose the weight she wants despite exercise and diet. Dx w/ PCOS and doing well on metformin. Had elevated prolactin and has f/u w/ endo in a few months. No nipple d/c.     Subjective      Review of Systems:   Pertinent review of systems per HPI.    Review of Systems   Constitutional: Negative for activity change, appetite change, chills, diaphoresis, fatigue, fever and unexpected weight change.   HENT: Negative for congestion, dental problem, drooling, ear discharge, ear pain, facial swelling, hearing loss and mouth sores.    Eyes: Negative for pain, discharge and itching.   Respiratory: Negative for apnea, cough, choking, chest tightness and shortness of breath.    Cardiovascular: Negative for chest pain, palpitations and leg swelling.   Gastrointestinal: Negative for abdominal distention, abdominal pain, blood in stool, constipation and diarrhea.   Endocrine: Negative for cold intolerance, heat intolerance, polydipsia and polyuria.   Genitourinary: Negative for difficulty urinating, dysuria, frequency and hematuria.   Skin: Negative for color change, pallor, rash and wound.   Allergic/Immunologic: Negative for environmental allergies, food allergies and immunocompromised state.   Neurological: Negative for dizziness, weakness and light-headedness.   Psychiatric/Behavioral: Negative for agitation, behavioral problems, confusion, decreased concentration and self-injury. The patient is not nervous/anxious.    All other systems reviewed and are negative.    No Known Allergies    Objective     Physical Exam:  Vital Signs:   Vitals:    22 0937   BP: 116/74  "  Pulse: 78   Resp: 16   Temp: 96.9 °F (36.1 °C)   TempSrc: Temporal   SpO2: 98%   Weight: 62.6 kg (138 lb)   Height: 154.9 cm (61\")   PainSc: 0-No pain      Body mass index is 26.07 kg/m².    Physical Exam  Vitals and nursing note reviewed.   Constitutional:       General: She is not in acute distress.     Appearance: She is well-developed.   HENT:      Head: Normocephalic and atraumatic.      Right Ear: Tympanic membrane and external ear normal.      Left Ear: Tympanic membrane and external ear normal.   Eyes:      General: No scleral icterus.        Right eye: No discharge.         Left eye: No discharge.      Conjunctiva/sclera: Conjunctivae normal.   Cardiovascular:      Rate and Rhythm: Normal rate and regular rhythm.      Heart sounds: Normal heart sounds. No murmur heard.  No friction rub. No gallop.    Pulmonary:      Effort: Pulmonary effort is normal. No respiratory distress.      Breath sounds: Normal breath sounds. No wheezing or rales.   Skin:     General: Skin is warm and dry.      Coloration: Skin is not pale.         Assessment / Plan      Assessment & Plan:    1. Annual physical exam  Counseled on:  Mammo starting at age 40  Pap smear q5 years if normal pap cytology with neg HPV, otherwise q3 years  Colonoscopy at 45-51 y/o  PNA vaccinations starting at age 65  shingrix at age 50  Td/Tdap q 10 years  Wear seatbelt when driving  Flu shot annually    - CBC (No Diff)  - TSH Rfx On Abnormal To Free T4  - Lipid Panel  - Comprehensive Metabolic Panel  - Vitamin B12  - Hemoglobin A1c    2. Encounter for weight management  Discussed keto diet and intermittent fasting.   - Ambulatory Referral to Nutrition Services    3. Elevated prolactin level    - Prolactin    4. Need for COVID-19 vaccine    - COVID-19 Vaccine (Pfizer)      Magali Hay MD  01/13/2022   "

## 2022-01-14 LAB
ALBUMIN SERPL-MCNC: 4.8 G/DL (ref 3.5–5.2)
ALBUMIN/GLOB SERPL: 1.7 G/DL
ALP SERPL-CCNC: 60 U/L (ref 39–117)
ALT SERPL W P-5'-P-CCNC: 13 U/L (ref 1–33)
ANION GAP SERPL CALCULATED.3IONS-SCNC: 10.8 MMOL/L (ref 5–15)
AST SERPL-CCNC: 17 U/L (ref 1–32)
BILIRUB SERPL-MCNC: 0.3 MG/DL (ref 0–1.2)
BUN SERPL-MCNC: 12 MG/DL (ref 6–20)
BUN/CREAT SERPL: 14.1 (ref 7–25)
CALCIUM SPEC-SCNC: 9.8 MG/DL (ref 8.6–10.5)
CHLORIDE SERPL-SCNC: 104 MMOL/L (ref 98–107)
CHOLEST SERPL-MCNC: 150 MG/DL (ref 0–200)
CO2 SERPL-SCNC: 21.2 MMOL/L (ref 22–29)
CREAT SERPL-MCNC: 0.85 MG/DL (ref 0.57–1)
GFR SERPL CREATININE-BSD FRML MDRD: 80 ML/MIN/1.73
GLOBULIN UR ELPH-MCNC: 2.8 GM/DL
GLUCOSE SERPL-MCNC: 78 MG/DL (ref 65–99)
HDLC SERPL-MCNC: 52 MG/DL (ref 40–60)
LDLC SERPL CALC-MCNC: 81 MG/DL (ref 0–100)
LDLC/HDLC SERPL: 1.55 {RATIO}
POTASSIUM SERPL-SCNC: 4.1 MMOL/L (ref 3.5–5.2)
PROLACTIN SERPL-MCNC: 15.7 NG/ML (ref 4.79–23.3)
PROT SERPL-MCNC: 7.6 G/DL (ref 6–8.5)
SODIUM SERPL-SCNC: 136 MMOL/L (ref 136–145)
TRIGL SERPL-MCNC: 88 MG/DL (ref 0–150)
TSH SERPL DL<=0.05 MIU/L-ACNC: 3.01 UIU/ML (ref 0.27–4.2)
VLDLC SERPL-MCNC: 17 MG/DL (ref 5–40)

## 2022-02-16 ENCOUNTER — LAB (OUTPATIENT)
Dept: LAB | Facility: HOSPITAL | Age: 27
End: 2022-02-16

## 2022-02-16 ENCOUNTER — OFFICE VISIT (OUTPATIENT)
Dept: ENDOCRINOLOGY | Facility: CLINIC | Age: 27
End: 2022-02-16

## 2022-02-16 VITALS
DIASTOLIC BLOOD PRESSURE: 80 MMHG | HEART RATE: 66 BPM | HEIGHT: 61 IN | OXYGEN SATURATION: 100 % | SYSTOLIC BLOOD PRESSURE: 120 MMHG | WEIGHT: 137.8 LBS | BODY MASS INDEX: 26.01 KG/M2

## 2022-02-16 DIAGNOSIS — R79.89 ELEVATED PROLACTIN LEVEL: ICD-10-CM

## 2022-02-16 DIAGNOSIS — R79.89 HIGH SERUM 17-HYDROXYPROGESTERONE: ICD-10-CM

## 2022-02-16 DIAGNOSIS — E28.2 PCOS (POLYCYSTIC OVARIAN SYNDROME): Primary | ICD-10-CM

## 2022-02-16 LAB
PROLACTIN SERPL-MCNC: 23 NG/ML (ref 4.79–23.3)
T4 FREE SERPL-MCNC: 1.06 NG/DL (ref 0.93–1.7)
TSH SERPL DL<=0.05 MIU/L-ACNC: 4.41 UIU/ML (ref 0.27–4.2)

## 2022-02-16 PROCEDURE — 84443 ASSAY THYROID STIM HORMONE: CPT | Performed by: PHYSICIAN ASSISTANT

## 2022-02-16 PROCEDURE — 84439 ASSAY OF FREE THYROXINE: CPT | Performed by: PHYSICIAN ASSISTANT

## 2022-02-16 PROCEDURE — 83498 ASY HYDROXYPROGESTERONE 17-D: CPT | Performed by: PHYSICIAN ASSISTANT

## 2022-02-16 PROCEDURE — 99204 OFFICE O/P NEW MOD 45 MIN: CPT | Performed by: PHYSICIAN ASSISTANT

## 2022-02-16 PROCEDURE — 84146 ASSAY OF PROLACTIN: CPT | Performed by: PHYSICIAN ASSISTANT

## 2022-02-16 NOTE — PROGRESS NOTES
Mayda Hernandez is a 27 y.o. female. Is being seen today for consultation regarding elevated prolactin. Referred by Dr. Kelly Hoffmann.     Chief Complaint   Patient presents with   • Establish Care     ELevated Prolactin Level       HPI  27 y.o.female with PCOS on metformin comes in for further evaluation of elevated prolactin.   PCOS - diagnosed 12/2021 when to annual OB appt and reported irregular periods, pelvic u/s noted multiple cysts  Was on OCPs for 7 years, periods somewhat irregular before and after OCPS, around every 45 days. Periods are more regular since starting metformin 12/2021. Does have some diarrhea with metformin, 3-4 times per week, usually first thing in the morning. It is tolerable.   Hx of acne in the past. Took accutane in 2019 and since then no problems.   Hirsutism - none  Hair loss - none  Pregnancy - does not desire at the moment, but will very soon  Getting  in April.  No vision changes, headaches, breast discharge.  Has lost 15 pounds intentionally in preparation for the wedding.      Dad has prediabetes.   No family history of endocrine cancers.     Labs reviewed - mild prolactin elevation 25-36 with repeat normal at 15.7 1/2022, mildly elevated 17-OHP at 378, otherwise normal tsh, a1c, testosterone, dhea-s, fsh, lh, estradiol, progesterone    No symptoms of adrenal insufficiency.     The following portions of the patient's history were reviewed and updated as appropriate: allergies, current medications, past family history, past medical history, past social history, past surgical history and problem list.    No Known Allergies    Current Outpatient Medications on File Prior to Visit   Medication Sig Dispense Refill   • metFORMIN ER (GLUCOPHAGE-XR) 750 MG 24 hr tablet Take 1,500 mg by mouth Every Night.     • [DISCONTINUED] fluticasone (Flonase) 50 MCG/ACT nasal spray 2 sprays into the nostril(s) as directed by provider Daily. 11.1 mL 3   • [DISCONTINUED] loratadine (CLARITIN)  "10 MG tablet TAKE 1 TABLET BY MOUTH EVERY DAY 30 tablet 2     No current facility-administered medications on file prior to visit.       Past Medical History:   Diagnosis Date   • Abnormal Pap smear of cervix    • Acne    • Herpes     HSV-1 genital   • High risk HPV infection 04/06/2017   • LGSIL on Pap smear of cervix 01/13/2016   • PCOS (polycystic ovarian syndrome)        Past Surgical History:   Procedure Laterality Date   • WISDOM TOOTH EXTRACTION         Review of Systems  Review of Systems   Genitourinary: Positive for menstrual problem (irregular periods- improving).   All other systems reviewed and are negative.      Physical Exam   Objective   Blood pressure 120/80, pulse 66, height 154.9 cm (61\"), weight 62.5 kg (137 lb 12.8 oz), SpO2 100 %.    Physical Exam  Constitutional:       General: She is not in acute distress.     Appearance: She is well-developed. She is not diaphoretic.   HENT:      Head: Normocephalic.      Right Ear: External ear normal.      Left Ear: External ear normal.   Eyes:      General: Lids are normal.         Right eye: No discharge.         Left eye: No discharge.      Conjunctiva/sclera: Conjunctivae normal.   Neck:      Thyroid: No thyroid mass or thyromegaly.      Vascular: No JVD.      Trachea: No tracheal deviation.   Cardiovascular:      Rate and Rhythm: Normal rate and regular rhythm.      Heart sounds: Normal heart sounds. No murmur heard.      Pulmonary:      Effort: Pulmonary effort is normal. No respiratory distress.      Breath sounds: Normal breath sounds. No wheezing.   Musculoskeletal:         General: No tenderness.   Lymphadenopathy:      Cervical: No cervical adenopathy.   Skin:     General: Skin is warm and dry.      Findings: No erythema or rash.   Neurological:      Mental Status: She is alert and oriented to person, place, and time.   Psychiatric:         Speech: Speech normal.         Behavior: Behavior normal.         Thought Content: Thought content normal. "            Prolactin (01/13/2022 09:56)  Hemoglobin A1c (01/13/2022 09:49)  Vitamin B12 (01/13/2022 09:49)  Comprehensive Metabolic Panel (01/13/2022 09:49)  Lipid Panel (01/13/2022 09:49)  TSH Rfx On Abnormal To Free T4 (01/13/2022 09:49)  Prolactin (11/22/2021 08:45)  17-Hydroxyprogesterone (11/16/2021 09:44)  Testosterone Free Direct (11/16/2021 09:44)  Testosterone (11/16/2021 09:44)  Prolactin (11/16/2021 09:44)  Luteinizing Hormone (11/16/2021 09:44)  Follicle Stimulating Hormone (11/16/2021 09:44)  Estradiol (11/16/2021 09:44)  DHEA-Sulfate (11/16/2021 09:44)  Insulin, Random (11/16/2021 09:44)  Progesterone (11/16/2021 09:44)  TSH Rfx On Abnormal To Free T4 (11/16/2021 09:44)      Assessment / Plan    Diagnoses and all orders for this visit:    1. PCOS (polycystic ovarian syndrome) (Primary)  -     TSH  -     T4, Free  -     Prolactin    2. Elevated prolactin level  -     TSH  -     T4, Free  -     Prolactin    3. High serum 17-hydroxyprogesterone  -     17-Hydroxyprogesterone        Assessment/Plan     Elevated prolactin - mild elevation likely related to PCOS, has now normalized, TSH normal, low suspicion for prolactinoma, repeat labs today with plan to follow every 6 months for now - if levels increase may need MRI of pituitary    PCOS - periods more regular with metformin, acne resolved after accutane, no hirsutism, will desire pregnancy in the near future. a1c normal last month, fasting insulin level normal 11/2021, discussed increased risk of developing type 2 diabetes and importance of healthy diet, exercise, and maintaining a healthy weight. Testosterone and dhea-s normal    Elevated 17-OHP - expect repeat to be normal, no symptoms of adrenal insufficiency, normal testosterone and dhea-s      Follow Up: Return in about 6 months (around 8/16/2022).    Patient Instructions:   There are no Patient Instructions on file for this visit.      Cristopher Talamantes PA-C

## 2022-02-18 ENCOUNTER — PATIENT ROUNDING (BHMG ONLY) (OUTPATIENT)
Dept: ENDOCRINOLOGY | Facility: CLINIC | Age: 27
End: 2022-02-18

## 2022-02-18 DIAGNOSIS — R94.6 ABNORMAL THYROID FUNCTION TEST: Primary | ICD-10-CM

## 2022-02-18 NOTE — PROGRESS NOTES
A [email or web address] message has been sent to the patient for patient rounding with [facility]

## 2022-02-20 LAB — 17OHP SERPL-MCNC: 237 NG/DL
